# Patient Record
Sex: FEMALE | Race: ASIAN | NOT HISPANIC OR LATINO | URBAN - METROPOLITAN AREA
[De-identification: names, ages, dates, MRNs, and addresses within clinical notes are randomized per-mention and may not be internally consistent; named-entity substitution may affect disease eponyms.]

---

## 2017-10-20 ENCOUNTER — EMERGENCY (EMERGENCY)
Facility: HOSPITAL | Age: 22
LOS: 1 days | Discharge: HOME | End: 2017-10-20
Attending: EMERGENCY MEDICINE | Admitting: EMERGENCY MEDICINE
Payer: COMMERCIAL

## 2017-10-20 VITALS
TEMPERATURE: 99 F | DIASTOLIC BLOOD PRESSURE: 74 MMHG | OXYGEN SATURATION: 97 % | HEART RATE: 90 BPM | RESPIRATION RATE: 18 BRPM | SYSTOLIC BLOOD PRESSURE: 109 MMHG

## 2017-10-20 PROCEDURE — 99283 EMERGENCY DEPT VISIT LOW MDM: CPT | Mod: 25

## 2017-10-20 PROCEDURE — 73130 X-RAY EXAM OF HAND: CPT | Mod: 26,LT

## 2017-10-20 PROCEDURE — 29125 APPL SHORT ARM SPLINT STATIC: CPT | Mod: LT

## 2017-10-20 NOTE — ED ADULT TRIAGE NOTE - CHIEF COMPLAINT QUOTE
Patient to Ed with complaint of left wrist pain, tenderness, and ecchymosis to area.  Injured wrist 1 week PTA

## 2017-10-20 NOTE — ED SUB INTERN NOTE - GENERIC SYMPTOMS POSITIVE
pt c/o LEFT wrist injury on 10/11 when pt "banged hand on mannequin". + ecchymotic area noted to lateral aspect of ventral wrist.  pain with palpation at trapezius bone. full flexion & extension of wrist.  radial pulse 2+, no sensory derangements to distal digits. cap refill less than two seconds. no pain medication taken today

## 2017-10-20 NOTE — ED PROVIDER NOTE - DIAGNOSTIC INTERPRETATION
ER Physician: Conrad Peck  INTERPRETATION:  no acute fracture; no soft tissue swelling noted; normal bony alignment.

## 2017-10-20 NOTE — ED PROVIDER NOTE - MUSCULOSKELETAL, MLM
Spine appears normal, range of motion is not limited, no muscle or joint tenderness LUE: small area of ecchymosis on the volar/radial aspect of the wrist.  minimal tenderness over the snuff box.  Normal distal motor and sensory of the median, ulnar and radial nerves.

## 2017-10-20 NOTE — ED PROVIDER NOTE - OBJECTIVE STATEMENT
21 yo RHD F with a no pertinent hx presents to the ED c/o left wrist pain after injuring it 1 week ago. Pt states she hit her wrist against a mannequin. No pain at rest. Went to [facility] today and was referred to come to the ED today. No numbness, paresthesias, or focal weakness. 21 yo RHD F with a no pertinent hx presents to the ED c/o left wrist pain after injuring it 1 week ago. Pt states she hit her wrist against a mannequin. No pain at rest. Went to [facility] today and was referred to come to the ED today. No numbness, paresthesias, or focal weakness. Denies taking any pain medications for relief.

## 2017-10-20 NOTE — ED PROVIDER NOTE - MEDICAL DECISION MAKING DETAILS
L wrist/hand pain x 1 week.  Denies FOOSH mechanism.  Minimal edema/tenderness present.  Normal distal neurovascular exam and intrinsic hand muscle exam.  X-rays unremarkable.  DAE, patrickint.  Hand therapy follow up

## 2017-10-24 DIAGNOSIS — S60.212A CONTUSION OF LEFT WRIST, INITIAL ENCOUNTER: ICD-10-CM
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
## 2017-10-24 DIAGNOSIS — S69.92XA UNSPECIFIED INJURY OF LEFT WRIST, HAND AND FINGER(S), INITIAL ENCOUNTER: ICD-10-CM

## 2021-06-16 NOTE — ED SUB INTERN NOTE - CROS ED SKIN ALL NEG
- - - Thalidomide Counseling: I discussed with the patient the risks of thalidomide including but not limited to birth defects, anxiety, weakness, chest pain, dizziness, cough and severe allergy.

## 2025-01-29 NOTE — ED ADULT NURSE NOTE - CAS DISCH ACCOMP BY
Patient reports to be taking 10 mg aderall, no side effects (weight loss etc.). Patient and father are unaware of requirement of visiting ever 3 months for medication check. Patient and father made aware, will schedule medication check.     Plan  Refill medication  F/u in 3 months        Self